# Patient Record
Sex: MALE | Race: OTHER
[De-identification: names, ages, dates, MRNs, and addresses within clinical notes are randomized per-mention and may not be internally consistent; named-entity substitution may affect disease eponyms.]

---

## 2019-02-16 ENCOUNTER — HOSPITAL ENCOUNTER (INPATIENT)
Dept: HOSPITAL 54 - GPS | Age: 69
LOS: 9 days | Discharge: HOME | DRG: 885 | End: 2019-02-25
Attending: PSYCHIATRY & NEUROLOGY | Admitting: PSYCHIATRY & NEUROLOGY
Payer: MEDICARE

## 2019-02-16 VITALS — SYSTOLIC BLOOD PRESSURE: 165 MMHG | DIASTOLIC BLOOD PRESSURE: 83 MMHG

## 2019-02-16 VITALS — SYSTOLIC BLOOD PRESSURE: 138 MMHG | DIASTOLIC BLOOD PRESSURE: 90 MMHG

## 2019-02-16 VITALS — SYSTOLIC BLOOD PRESSURE: 141 MMHG | DIASTOLIC BLOOD PRESSURE: 81 MMHG

## 2019-02-16 VITALS — HEIGHT: 68 IN | WEIGHT: 159 LBS | BODY MASS INDEX: 24.1 KG/M2

## 2019-02-16 VITALS — DIASTOLIC BLOOD PRESSURE: 99 MMHG | SYSTOLIC BLOOD PRESSURE: 160 MMHG

## 2019-02-16 DIAGNOSIS — Z81.8: ICD-10-CM

## 2019-02-16 DIAGNOSIS — F25.0: Primary | ICD-10-CM

## 2019-02-16 DIAGNOSIS — E87.1: ICD-10-CM

## 2019-02-16 DIAGNOSIS — I10: ICD-10-CM

## 2019-02-16 DIAGNOSIS — D63.8: ICD-10-CM

## 2019-02-16 DIAGNOSIS — Z86.73: ICD-10-CM

## 2019-02-16 DIAGNOSIS — F17.200: ICD-10-CM

## 2019-02-16 DIAGNOSIS — E86.1: ICD-10-CM

## 2019-02-16 DIAGNOSIS — F32.9: ICD-10-CM

## 2019-02-16 DIAGNOSIS — T50.901D: ICD-10-CM

## 2019-02-16 DIAGNOSIS — Z87.19: ICD-10-CM

## 2019-02-16 DIAGNOSIS — E44.1: ICD-10-CM

## 2019-02-16 LAB
BASOPHILS # BLD AUTO: 0 /CMM (ref 0–0.2)
BASOPHILS NFR BLD AUTO: 0.5 % (ref 0–2)
BUN SERPL-MCNC: 11 MG/DL (ref 7–18)
CALCIUM SERPL-MCNC: 8.4 MG/DL (ref 8.5–10.1)
CHLORIDE SERPL-SCNC: 98 MMOL/L (ref 98–107)
CHOLEST SERPL-MCNC: 137 MG/DL (ref ?–200)
CO2 SERPL-SCNC: 29 MMOL/L (ref 21–32)
CREAT SERPL-MCNC: 0.8 MG/DL (ref 0.6–1.3)
EOSINOPHIL NFR BLD AUTO: 0.7 % (ref 0–6)
GLUCOSE SERPL-MCNC: 92 MG/DL (ref 74–106)
HCT VFR BLD AUTO: 39 % (ref 39–51)
HDLC SERPL-MCNC: 52 MG/DL (ref 40–60)
HGB BLD-MCNC: 13.3 G/DL (ref 13.5–17.5)
LDLC SERPL DIRECT ASSAY-MCNC: 72 MG/DL (ref 0–99)
LYMPHOCYTES NFR BLD AUTO: 0.8 /CMM (ref 0.8–4.8)
LYMPHOCYTES NFR BLD AUTO: 9 % (ref 20–44)
MCHC RBC AUTO-ENTMCNC: 34 G/DL (ref 31–36)
MCV RBC AUTO: 90 FL (ref 80–96)
MONOCYTES NFR BLD AUTO: 1 /CMM (ref 0.1–1.3)
MONOCYTES NFR BLD AUTO: 11.9 % (ref 2–12)
NEUTROPHILS # BLD AUTO: 6.7 /CMM (ref 1.8–8.9)
NEUTROPHILS NFR BLD AUTO: 77.9 % (ref 43–81)
PLATELET # BLD AUTO: 331 /CMM (ref 150–450)
POTASSIUM SERPL-SCNC: 3.9 MMOL/L (ref 3.5–5.1)
RBC # BLD AUTO: 4.34 MIL/UL (ref 4.5–6)
SODIUM SERPL-SCNC: 133 MMOL/L (ref 136–145)
TRIGL SERPL-MCNC: 66 MG/DL (ref 30–150)
WBC NRBC COR # BLD AUTO: 8.6 K/UL (ref 4.3–11)

## 2019-02-16 RX ADMIN — Medication PRN EA: at 18:34

## 2019-02-16 RX ADMIN — FERROUS SULFATE TAB 325 MG (65 MG ELEMENTAL FE) SCH MG: 325 (65 FE) TAB at 09:32

## 2019-02-16 RX ADMIN — PANTOPRAZOLE SODIUM SCH MG: 40 TABLET, DELAYED RELEASE ORAL at 17:10

## 2019-02-16 RX ADMIN — DIVALPROEX SODIUM SCH MG: 125 TABLET, DELAYED RELEASE ORAL at 13:07

## 2019-02-16 RX ADMIN — LOSARTAN POTASSIUM SCH MG: 50 TABLET, FILM COATED ORAL at 09:31

## 2019-02-16 RX ADMIN — PANTOPRAZOLE SODIUM SCH MG: 40 TABLET, DELAYED RELEASE ORAL at 09:32

## 2019-02-16 RX ADMIN — DIVALPROEX SODIUM SCH MG: 125 TABLET, DELAYED RELEASE ORAL at 21:03

## 2019-02-16 RX ADMIN — AMLODIPINE BESYLATE SCH MG: 5 TABLET ORAL at 09:31

## 2019-02-16 RX ADMIN — LORAZEPAM PRN MG: 0.5 TABLET ORAL at 09:35

## 2019-02-16 RX ADMIN — BENZTROPINE MESYLATE SCH MG: 1 TABLET ORAL at 13:07

## 2019-02-16 RX ADMIN — TAMSULOSIN HYDROCHLORIDE SCH MG: 0.4 CAPSULE ORAL at 17:14

## 2019-02-16 RX ADMIN — LORAZEPAM PRN MG: 0.5 TABLET ORAL at 17:10

## 2019-02-16 RX ADMIN — BENZTROPINE MESYLATE SCH MG: 1 TABLET ORAL at 17:10

## 2019-02-16 NOTE — NUR
PAGED AND SOKE WITH MATTHEW MEHTA, RE: MED RECON - DONE

MRSA SCREEN DONE. ROOM MATE EKTA RESENDEZ NOTIFIED ABOUT ADMISSION PER PATIENT'S ADMISSION TO 
GPS.

## 2019-02-16 NOTE — NUR
RN NOTES

 ADMINISTERED ATIVAN 1 MG  PO PRN FOR ANXIETY PER PATIENT REQUEST. V/S TAKEN /90, 
P-21, CONTINUED MONITORING.

## 2019-02-16 NOTE — NUR
ADMITTED A 68 YEAR OLD ON 5150 HOLD FOR DTS, CAME TO THE UNIT AROUND 0115, BROUGHT IN BY 
PARAMEDICS. PATIENT WAS GIVEN ORIENTATION ON ADMISSION. PATIENT OVERDOSED ON PRESCRIBED 
MEDICATIONS. DENIES SI/HI AT THIS TIME. PATIENT AWAKE, ALERT, ORIENTED 3, NO APPARENT 
DISTRESS NOTED. SHOWS NO S/S OF ANY PAIN. PATIENT IS A/O X3, CALM, COOPERATIVE, APPROPRIATE, 
THOUGHT PROCESS INTACT, WELL GROOMED. DENIES ANY PAIN. AMBULATORY, SKIN WARM DRY AND INTACT. 
DISCOLORATION NOTED BOTH ARMS, PHOTO TAKEN, MRSA SCREEN DONE. BELONGINGS WERE INVENTORIED 
AND CHECKED FOR CONTRABAND. BED LOCKED AND PLACED ON LOWEST POSITION. WILL CONTINUE TO 
MONITOR Q 15 MINS. TO MAINTAIN SAFETY.

## 2019-02-17 VITALS — DIASTOLIC BLOOD PRESSURE: 87 MMHG | SYSTOLIC BLOOD PRESSURE: 158 MMHG

## 2019-02-17 VITALS — SYSTOLIC BLOOD PRESSURE: 145 MMHG | DIASTOLIC BLOOD PRESSURE: 92 MMHG

## 2019-02-17 VITALS — SYSTOLIC BLOOD PRESSURE: 153 MMHG | DIASTOLIC BLOOD PRESSURE: 89 MMHG

## 2019-02-17 LAB
ALBUMIN SERPL BCP-MCNC: 3.3 G/DL (ref 3.4–5)
ALP SERPL-CCNC: 68 U/L (ref 46–116)
ALT SERPL W P-5'-P-CCNC: 28 U/L (ref 12–78)
AST SERPL W P-5'-P-CCNC: 24 U/L (ref 15–37)
BASOPHILS # BLD AUTO: 0.1 /CMM (ref 0–0.2)
BASOPHILS NFR BLD AUTO: 0.8 % (ref 0–2)
BILIRUB SERPL-MCNC: 0.3 MG/DL (ref 0.2–1)
BUN SERPL-MCNC: 9 MG/DL (ref 7–18)
CALCIUM SERPL-MCNC: 8.7 MG/DL (ref 8.5–10.1)
CHLORIDE SERPL-SCNC: 99 MMOL/L (ref 98–107)
CHOLEST SERPL-MCNC: 139 MG/DL (ref ?–200)
CO2 SERPL-SCNC: 25 MMOL/L (ref 21–32)
CREAT SERPL-MCNC: 0.8 MG/DL (ref 0.6–1.3)
EOSINOPHIL NFR BLD AUTO: 1.9 % (ref 0–6)
GLUCOSE SERPL-MCNC: 195 MG/DL (ref 74–106)
HCT VFR BLD AUTO: 41 % (ref 39–51)
HDLC SERPL-MCNC: 51 MG/DL (ref 40–60)
HGB BLD-MCNC: 13.7 G/DL (ref 13.5–17.5)
LDLC SERPL DIRECT ASSAY-MCNC: 75 MG/DL (ref 0–99)
LYMPHOCYTES NFR BLD AUTO: 1.2 /CMM (ref 0.8–4.8)
LYMPHOCYTES NFR BLD AUTO: 15.1 % (ref 20–44)
MCHC RBC AUTO-ENTMCNC: 33 G/DL (ref 31–36)
MCV RBC AUTO: 91 FL (ref 80–96)
MONOCYTES NFR BLD AUTO: 1 /CMM (ref 0.1–1.3)
MONOCYTES NFR BLD AUTO: 13.4 % (ref 2–12)
NEUTROPHILS # BLD AUTO: 5.3 /CMM (ref 1.8–8.9)
NEUTROPHILS NFR BLD AUTO: 68.8 % (ref 43–81)
PLATELET # BLD AUTO: 321 /CMM (ref 150–450)
POTASSIUM SERPL-SCNC: 4.1 MMOL/L (ref 3.5–5.1)
PROT SERPL-MCNC: 6.5 G/DL (ref 6.4–8.2)
RBC # BLD AUTO: 4.54 MIL/UL (ref 4.5–6)
SODIUM SERPL-SCNC: 134 MMOL/L (ref 136–145)
TRIGL SERPL-MCNC: 76 MG/DL (ref 30–150)
WBC NRBC COR # BLD AUTO: 7.7 K/UL (ref 4.3–11)

## 2019-02-17 RX ADMIN — PANTOPRAZOLE SODIUM SCH MG: 40 TABLET, DELAYED RELEASE ORAL at 09:56

## 2019-02-17 RX ADMIN — BENZTROPINE MESYLATE SCH MG: 1 TABLET ORAL at 09:56

## 2019-02-17 RX ADMIN — LORAZEPAM PRN MG: 0.5 TABLET ORAL at 09:22

## 2019-02-17 RX ADMIN — BENZTROPINE MESYLATE SCH MG: 1 TABLET ORAL at 16:20

## 2019-02-17 RX ADMIN — Medication PRN MG: at 16:20

## 2019-02-17 RX ADMIN — PANTOPRAZOLE SODIUM SCH MG: 40 TABLET, DELAYED RELEASE ORAL at 16:20

## 2019-02-17 RX ADMIN — TAMSULOSIN HYDROCHLORIDE SCH MG: 0.4 CAPSULE ORAL at 17:16

## 2019-02-17 RX ADMIN — LORAZEPAM PRN MG: 0.5 TABLET ORAL at 17:34

## 2019-02-17 RX ADMIN — LOSARTAN POTASSIUM SCH MG: 50 TABLET, FILM COATED ORAL at 09:57

## 2019-02-17 RX ADMIN — DIVALPROEX SODIUM SCH MG: 125 TABLET, DELAYED RELEASE ORAL at 09:53

## 2019-02-17 RX ADMIN — FERROUS SULFATE TAB 325 MG (65 MG ELEMENTAL FE) SCH MG: 325 (65 FE) TAB at 09:53

## 2019-02-17 RX ADMIN — AMLODIPINE BESYLATE SCH MG: 5 TABLET ORAL at 09:56

## 2019-02-17 RX ADMIN — DIVALPROEX SODIUM SCH MG: 125 TABLET, DELAYED RELEASE ORAL at 20:19

## 2019-02-17 RX ADMIN — Medication PRN EA: at 15:24

## 2019-02-17 NOTE — NUR
GPS/RN-NOTES

PATIENT STATED" I NEED MY ATIVAN FOR ANXIETY". ATIVAN 1MG P.O GIVEN AS PRN ORDER. WILL CONT. 
MONITORING FOR SAFETY AND BEHAVIOR.

## 2019-02-17 NOTE — NUR
GPS/RN-NOTES

PATIENT REQUESTING FOR ATIVAN FOR ANXIETY. ATIVAN 1MG P.O GIVEN AS PRN ORDER. WILL CONT. 
MONITORING FOR SAFETY AND BEHAVIOR.

## 2019-02-18 VITALS — SYSTOLIC BLOOD PRESSURE: 152 MMHG | DIASTOLIC BLOOD PRESSURE: 85 MMHG

## 2019-02-18 VITALS — DIASTOLIC BLOOD PRESSURE: 81 MMHG | SYSTOLIC BLOOD PRESSURE: 157 MMHG

## 2019-02-18 VITALS — SYSTOLIC BLOOD PRESSURE: 144 MMHG | DIASTOLIC BLOOD PRESSURE: 87 MMHG

## 2019-02-18 RX ADMIN — DIVALPROEX SODIUM SCH MG: 125 TABLET, DELAYED RELEASE ORAL at 21:11

## 2019-02-18 RX ADMIN — PANTOPRAZOLE SODIUM SCH MG: 40 TABLET, DELAYED RELEASE ORAL at 16:20

## 2019-02-18 RX ADMIN — BENZTROPINE MESYLATE SCH MG: 1 TABLET ORAL at 08:16

## 2019-02-18 RX ADMIN — PANTOPRAZOLE SODIUM SCH MG: 40 TABLET, DELAYED RELEASE ORAL at 08:16

## 2019-02-18 RX ADMIN — LORAZEPAM PRN MG: 0.5 TABLET ORAL at 16:19

## 2019-02-18 RX ADMIN — DIVALPROEX SODIUM SCH MG: 125 TABLET, DELAYED RELEASE ORAL at 08:15

## 2019-02-18 RX ADMIN — FERROUS SULFATE TAB 325 MG (65 MG ELEMENTAL FE) SCH MG: 325 (65 FE) TAB at 08:36

## 2019-02-18 RX ADMIN — LOSARTAN POTASSIUM SCH MG: 50 TABLET, FILM COATED ORAL at 08:16

## 2019-02-18 RX ADMIN — AMLODIPINE BESYLATE SCH MG: 5 TABLET ORAL at 08:17

## 2019-02-18 RX ADMIN — BENZTROPINE MESYLATE SCH MG: 1 TABLET ORAL at 16:20

## 2019-02-18 RX ADMIN — LORAZEPAM PRN MG: 0.5 TABLET ORAL at 08:15

## 2019-02-18 RX ADMIN — TAMSULOSIN HYDROCHLORIDE SCH MG: 0.4 CAPSULE ORAL at 17:44

## 2019-02-18 NOTE — NUR
ALEX contacted pts sister Regi [phone] to discuss discharge planning and for collateral 
information. Regi stated pt was hospitalized 3.5 weeks ago and had received a Risperdal shot 
by Dr. John Hurd at [facility]. Regi stated that she would 
be available to pick pt up when stable for discharge and transport home.

## 2019-02-18 NOTE — NUR
ALEX contacted [facility] Address: [address], [place] 
[address] Phone: [phone] to confirm pt had received a Risperdal IM. Office was closed due 
to the Holiday. ALEX will follow-up tomorrow 2/19/19.

## 2019-02-18 NOTE — NUR
GPS/RN-NOTES

PATIENT REQUESTING ATIVAN FOR ANXIETY. ATIVAN 1MG P.O GIVEN AS PRN ORDER. WILL CONT. 
MONITORING FOR SAFETY AND BEHAVIOR.

## 2019-02-18 NOTE — NUR
INITIAL DISCHARGE PLAN: Patient wishes to return home to [address] [place] [address] [address] [place] 
[address] [phone]. Pts sister Regi [phone] will transport pt home once stable for 
discharge. SW will help form a safe and proper discharge in collaboration with pt and MD.

## 2019-02-19 VITALS — SYSTOLIC BLOOD PRESSURE: 156 MMHG | DIASTOLIC BLOOD PRESSURE: 95 MMHG

## 2019-02-19 VITALS — DIASTOLIC BLOOD PRESSURE: 93 MMHG | SYSTOLIC BLOOD PRESSURE: 155 MMHG

## 2019-02-19 VITALS — DIASTOLIC BLOOD PRESSURE: 80 MMHG | SYSTOLIC BLOOD PRESSURE: 142 MMHG

## 2019-02-19 RX ADMIN — LOSARTAN POTASSIUM SCH MG: 50 TABLET, FILM COATED ORAL at 08:32

## 2019-02-19 RX ADMIN — FERROUS SULFATE TAB 325 MG (65 MG ELEMENTAL FE) SCH MG: 325 (65 FE) TAB at 08:33

## 2019-02-19 RX ADMIN — DIVALPROEX SODIUM SCH MG: 125 TABLET, DELAYED RELEASE ORAL at 21:11

## 2019-02-19 RX ADMIN — BENZTROPINE MESYLATE SCH MG: 1 TABLET ORAL at 17:20

## 2019-02-19 RX ADMIN — PANTOPRAZOLE SODIUM SCH MG: 40 TABLET, DELAYED RELEASE ORAL at 08:33

## 2019-02-19 RX ADMIN — PANTOPRAZOLE SODIUM SCH MG: 40 TABLET, DELAYED RELEASE ORAL at 17:19

## 2019-02-19 RX ADMIN — Medication PRN MG: at 17:20

## 2019-02-19 RX ADMIN — BENZTROPINE MESYLATE SCH MG: 1 TABLET ORAL at 08:32

## 2019-02-19 RX ADMIN — ATORVASTATIN CALCIUM SCH MG: 10 TABLET, FILM COATED ORAL at 21:12

## 2019-02-19 RX ADMIN — LORAZEPAM PRN MG: 0.5 TABLET ORAL at 09:36

## 2019-02-19 RX ADMIN — Medication PRN MG: at 08:32

## 2019-02-19 RX ADMIN — AMLODIPINE BESYLATE SCH MG: 5 TABLET ORAL at 08:33

## 2019-02-19 RX ADMIN — DIVALPROEX SODIUM SCH MG: 125 TABLET, DELAYED RELEASE ORAL at 08:32

## 2019-02-19 RX ADMIN — Medication PRN EA: at 12:18

## 2019-02-19 RX ADMIN — TAMSULOSIN HYDROCHLORIDE SCH MG: 0.4 CAPSULE ORAL at 17:19

## 2019-02-19 NOTE — NUR
ALEX received a phone call from KERI Falcon at [facility] Address: [address], [place] [address] Phone: [phone] who informed SW that pt was 
recently given 2 Invega Sustenna injections one on 2/6/19 234mg, and the other on 2/13/19 
156mg. Terrie, stated pt is due for another injection on 3/13/19. She also stated pt is 
taking Congestin .5mg BID and Chlorpromazine 200mg at bedtime. ALEX informed her that she 
would notify psychiatrist for possible medication adjustments. ALEX will also fax [phone] 
discharge paperwork and contact her to schedule an aftercare appointment.

## 2019-02-20 VITALS — SYSTOLIC BLOOD PRESSURE: 162 MMHG | DIASTOLIC BLOOD PRESSURE: 94 MMHG

## 2019-02-20 VITALS — SYSTOLIC BLOOD PRESSURE: 135 MMHG | DIASTOLIC BLOOD PRESSURE: 69 MMHG

## 2019-02-20 VITALS — DIASTOLIC BLOOD PRESSURE: 85 MMHG | SYSTOLIC BLOOD PRESSURE: 155 MMHG

## 2019-02-20 RX ADMIN — FERROUS SULFATE TAB 325 MG (65 MG ELEMENTAL FE) SCH MG: 325 (65 FE) TAB at 08:22

## 2019-02-20 RX ADMIN — DIVALPROEX SODIUM SCH MG: 125 TABLET, DELAYED RELEASE ORAL at 08:22

## 2019-02-20 RX ADMIN — BENZTROPINE MESYLATE SCH MG: 1 TABLET ORAL at 08:23

## 2019-02-20 RX ADMIN — LORAZEPAM PRN MG: 0.5 TABLET ORAL at 13:26

## 2019-02-20 RX ADMIN — ATORVASTATIN CALCIUM SCH MG: 10 TABLET, FILM COATED ORAL at 20:36

## 2019-02-20 RX ADMIN — LORAZEPAM PRN MG: 0.5 TABLET ORAL at 20:36

## 2019-02-20 RX ADMIN — LORAZEPAM PRN MG: 0.5 TABLET ORAL at 00:00

## 2019-02-20 RX ADMIN — PANTOPRAZOLE SODIUM SCH MG: 40 TABLET, DELAYED RELEASE ORAL at 17:05

## 2019-02-20 RX ADMIN — LOSARTAN POTASSIUM SCH MG: 50 TABLET, FILM COATED ORAL at 08:24

## 2019-02-20 RX ADMIN — PANTOPRAZOLE SODIUM SCH MG: 40 TABLET, DELAYED RELEASE ORAL at 08:23

## 2019-02-20 RX ADMIN — ASPIRIN 81 MG SCH MG: 81 TABLET ORAL at 09:17

## 2019-02-20 RX ADMIN — TAMSULOSIN HYDROCHLORIDE SCH MG: 0.4 CAPSULE ORAL at 17:05

## 2019-02-20 RX ADMIN — BENZTROPINE MESYLATE SCH MG: 1 TABLET ORAL at 17:05

## 2019-02-20 RX ADMIN — DIVALPROEX SODIUM SCH MG: 125 TABLET, DELAYED RELEASE ORAL at 20:36

## 2019-02-20 RX ADMIN — AMLODIPINE BESYLATE SCH MG: 5 TABLET ORAL at 08:25

## 2019-02-21 VITALS — DIASTOLIC BLOOD PRESSURE: 92 MMHG | SYSTOLIC BLOOD PRESSURE: 160 MMHG

## 2019-02-21 VITALS — DIASTOLIC BLOOD PRESSURE: 89 MMHG | SYSTOLIC BLOOD PRESSURE: 152 MMHG

## 2019-02-21 RX ADMIN — FERROUS SULFATE TAB 325 MG (65 MG ELEMENTAL FE) SCH MG: 325 (65 FE) TAB at 08:38

## 2019-02-21 RX ADMIN — DIVALPROEX SODIUM SCH MG: 125 TABLET, DELAYED RELEASE ORAL at 20:49

## 2019-02-21 RX ADMIN — BENZTROPINE MESYLATE SCH MG: 1 TABLET ORAL at 17:21

## 2019-02-21 RX ADMIN — ATORVASTATIN CALCIUM SCH MG: 10 TABLET, FILM COATED ORAL at 21:04

## 2019-02-21 RX ADMIN — AMLODIPINE BESYLATE SCH MG: 5 TABLET ORAL at 08:39

## 2019-02-21 RX ADMIN — TAMSULOSIN HYDROCHLORIDE SCH MG: 0.4 CAPSULE ORAL at 17:21

## 2019-02-21 RX ADMIN — LORAZEPAM PRN MG: 0.5 TABLET ORAL at 10:21

## 2019-02-21 RX ADMIN — LORAZEPAM PRN MG: 0.5 TABLET ORAL at 19:45

## 2019-02-21 RX ADMIN — ASPIRIN 81 MG SCH MG: 81 TABLET ORAL at 08:38

## 2019-02-21 RX ADMIN — PANTOPRAZOLE SODIUM SCH MG: 40 TABLET, DELAYED RELEASE ORAL at 17:21

## 2019-02-21 RX ADMIN — DIVALPROEX SODIUM SCH MG: 125 TABLET, DELAYED RELEASE ORAL at 08:37

## 2019-02-21 RX ADMIN — PANTOPRAZOLE SODIUM SCH MG: 40 TABLET, DELAYED RELEASE ORAL at 08:38

## 2019-02-21 RX ADMIN — BENZTROPINE MESYLATE SCH MG: 1 TABLET ORAL at 08:37

## 2019-02-21 RX ADMIN — LOSARTAN POTASSIUM SCH MG: 50 TABLET, FILM COATED ORAL at 08:38

## 2019-02-22 VITALS — DIASTOLIC BLOOD PRESSURE: 68 MMHG | SYSTOLIC BLOOD PRESSURE: 121 MMHG

## 2019-02-22 VITALS — SYSTOLIC BLOOD PRESSURE: 155 MMHG | DIASTOLIC BLOOD PRESSURE: 95 MMHG

## 2019-02-22 VITALS — SYSTOLIC BLOOD PRESSURE: 144 MMHG | DIASTOLIC BLOOD PRESSURE: 85 MMHG

## 2019-02-22 RX ADMIN — BENZTROPINE MESYLATE SCH MG: 1 TABLET ORAL at 09:43

## 2019-02-22 RX ADMIN — LOSARTAN POTASSIUM SCH MG: 50 TABLET, FILM COATED ORAL at 09:43

## 2019-02-22 RX ADMIN — TAMSULOSIN HYDROCHLORIDE SCH MG: 0.4 CAPSULE ORAL at 18:09

## 2019-02-22 RX ADMIN — PANTOPRAZOLE SODIUM SCH MG: 40 TABLET, DELAYED RELEASE ORAL at 16:14

## 2019-02-22 RX ADMIN — LORAZEPAM PRN MG: 0.5 TABLET ORAL at 16:14

## 2019-02-22 RX ADMIN — BENZTROPINE MESYLATE SCH MG: 1 TABLET ORAL at 16:14

## 2019-02-22 RX ADMIN — PANTOPRAZOLE SODIUM SCH MG: 40 TABLET, DELAYED RELEASE ORAL at 09:42

## 2019-02-22 RX ADMIN — AMLODIPINE BESYLATE SCH MG: 5 TABLET ORAL at 09:43

## 2019-02-22 RX ADMIN — DIVALPROEX SODIUM SCH MG: 125 TABLET, DELAYED RELEASE ORAL at 21:43

## 2019-02-22 RX ADMIN — ASPIRIN 81 MG SCH MG: 81 TABLET ORAL at 09:42

## 2019-02-22 RX ADMIN — DIVALPROEX SODIUM SCH MG: 125 TABLET, DELAYED RELEASE ORAL at 09:42

## 2019-02-22 RX ADMIN — LORAZEPAM PRN MG: 0.5 TABLET ORAL at 10:58

## 2019-02-22 RX ADMIN — ATORVASTATIN CALCIUM SCH MG: 10 TABLET, FILM COATED ORAL at 23:01

## 2019-02-22 RX ADMIN — Medication PRN MG: at 09:42

## 2019-02-22 RX ADMIN — Medication PRN MG: at 16:14

## 2019-02-22 RX ADMIN — FERROUS SULFATE TAB 325 MG (65 MG ELEMENTAL FE) SCH MG: 325 (65 FE) TAB at 09:42

## 2019-02-23 VITALS — SYSTOLIC BLOOD PRESSURE: 159 MMHG | DIASTOLIC BLOOD PRESSURE: 91 MMHG

## 2019-02-23 VITALS — SYSTOLIC BLOOD PRESSURE: 144 MMHG | DIASTOLIC BLOOD PRESSURE: 88 MMHG

## 2019-02-23 VITALS — SYSTOLIC BLOOD PRESSURE: 100 MMHG | DIASTOLIC BLOOD PRESSURE: 68 MMHG

## 2019-02-23 VITALS — DIASTOLIC BLOOD PRESSURE: 78 MMHG | SYSTOLIC BLOOD PRESSURE: 147 MMHG

## 2019-02-23 RX ADMIN — BENZTROPINE MESYLATE SCH MG: 1 TABLET ORAL at 17:20

## 2019-02-23 RX ADMIN — DIVALPROEX SODIUM SCH MG: 125 TABLET, DELAYED RELEASE ORAL at 08:19

## 2019-02-23 RX ADMIN — AMLODIPINE BESYLATE SCH MG: 5 TABLET ORAL at 08:17

## 2019-02-23 RX ADMIN — BENZTROPINE MESYLATE SCH MG: 1 TABLET ORAL at 08:19

## 2019-02-23 RX ADMIN — ATORVASTATIN CALCIUM SCH MG: 10 TABLET, FILM COATED ORAL at 20:46

## 2019-02-23 RX ADMIN — LORAZEPAM PRN MG: 0.5 TABLET ORAL at 02:36

## 2019-02-23 RX ADMIN — Medication PRN MG: at 17:20

## 2019-02-23 RX ADMIN — PANTOPRAZOLE SODIUM SCH MG: 40 TABLET, DELAYED RELEASE ORAL at 08:19

## 2019-02-23 RX ADMIN — PANTOPRAZOLE SODIUM SCH MG: 40 TABLET, DELAYED RELEASE ORAL at 17:20

## 2019-02-23 RX ADMIN — DIVALPROEX SODIUM SCH MG: 125 TABLET, DELAYED RELEASE ORAL at 20:47

## 2019-02-23 RX ADMIN — LORAZEPAM PRN MG: 0.5 TABLET ORAL at 21:30

## 2019-02-23 RX ADMIN — ASPIRIN 81 MG SCH MG: 81 TABLET ORAL at 08:19

## 2019-02-23 RX ADMIN — LORAZEPAM PRN MG: 0.5 TABLET ORAL at 12:28

## 2019-02-23 RX ADMIN — FERROUS SULFATE TAB 325 MG (65 MG ELEMENTAL FE) SCH MG: 325 (65 FE) TAB at 08:19

## 2019-02-23 RX ADMIN — LORAZEPAM PRN MG: 0.5 TABLET ORAL at 08:18

## 2019-02-23 RX ADMIN — LOSARTAN POTASSIUM SCH MG: 50 TABLET, FILM COATED ORAL at 08:16

## 2019-02-23 RX ADMIN — TAMSULOSIN HYDROCHLORIDE SCH MG: 0.4 CAPSULE ORAL at 17:20

## 2019-02-24 VITALS — SYSTOLIC BLOOD PRESSURE: 163 MMHG | DIASTOLIC BLOOD PRESSURE: 94 MMHG

## 2019-02-24 VITALS — DIASTOLIC BLOOD PRESSURE: 80 MMHG | SYSTOLIC BLOOD PRESSURE: 125 MMHG

## 2019-02-24 VITALS — DIASTOLIC BLOOD PRESSURE: 77 MMHG | SYSTOLIC BLOOD PRESSURE: 115 MMHG

## 2019-02-24 VITALS — SYSTOLIC BLOOD PRESSURE: 135 MMHG | DIASTOLIC BLOOD PRESSURE: 64 MMHG

## 2019-02-24 RX ADMIN — BENZTROPINE MESYLATE SCH MG: 1 TABLET ORAL at 17:12

## 2019-02-24 RX ADMIN — ATORVASTATIN CALCIUM SCH MG: 10 TABLET, FILM COATED ORAL at 21:08

## 2019-02-24 RX ADMIN — BENZTROPINE MESYLATE SCH MG: 1 TABLET ORAL at 08:57

## 2019-02-24 RX ADMIN — ASPIRIN 81 MG SCH MG: 81 TABLET ORAL at 08:56

## 2019-02-24 RX ADMIN — AMLODIPINE BESYLATE SCH MG: 5 TABLET ORAL at 08:57

## 2019-02-24 RX ADMIN — DIVALPROEX SODIUM SCH MG: 125 TABLET, DELAYED RELEASE ORAL at 21:08

## 2019-02-24 RX ADMIN — LORAZEPAM PRN MG: 1 TABLET ORAL at 17:10

## 2019-02-24 RX ADMIN — LOSARTAN POTASSIUM SCH MG: 50 TABLET, FILM COATED ORAL at 08:57

## 2019-02-24 RX ADMIN — TAMSULOSIN HYDROCHLORIDE SCH MG: 0.4 CAPSULE ORAL at 17:14

## 2019-02-24 RX ADMIN — PANTOPRAZOLE SODIUM SCH MG: 40 TABLET, DELAYED RELEASE ORAL at 08:56

## 2019-02-24 RX ADMIN — PANTOPRAZOLE SODIUM SCH MG: 40 TABLET, DELAYED RELEASE ORAL at 17:12

## 2019-02-24 RX ADMIN — Medication PRN MG: at 17:13

## 2019-02-24 RX ADMIN — DIVALPROEX SODIUM SCH MG: 125 TABLET, DELAYED RELEASE ORAL at 08:56

## 2019-02-24 RX ADMIN — Medication PRN MG: at 08:56

## 2019-02-24 RX ADMIN — FERROUS SULFATE TAB 325 MG (65 MG ELEMENTAL FE) SCH MG: 325 (65 FE) TAB at 08:58

## 2019-02-24 RX ADMIN — LORAZEPAM PRN MG: 0.5 TABLET ORAL at 08:56

## 2019-02-24 NOTE — NUR
GPS/RN 

CALLED FOR  PODIATRY CONSULT AS PER KAELA REQUEST



Office Telephone



[phone]



Office Fax



[phone]

## 2019-02-25 VITALS — SYSTOLIC BLOOD PRESSURE: 149 MMHG | DIASTOLIC BLOOD PRESSURE: 71 MMHG

## 2019-02-25 VITALS — DIASTOLIC BLOOD PRESSURE: 65 MMHG | SYSTOLIC BLOOD PRESSURE: 134 MMHG

## 2019-02-25 RX ADMIN — PANTOPRAZOLE SODIUM SCH MG: 40 TABLET, DELAYED RELEASE ORAL at 09:01

## 2019-02-25 RX ADMIN — LORAZEPAM PRN MG: 1 TABLET ORAL at 12:57

## 2019-02-25 RX ADMIN — FERROUS SULFATE TAB 325 MG (65 MG ELEMENTAL FE) SCH MG: 325 (65 FE) TAB at 09:00

## 2019-02-25 RX ADMIN — BENZTROPINE MESYLATE SCH MG: 1 TABLET ORAL at 09:01

## 2019-02-25 RX ADMIN — Medication PRN MG: at 09:01

## 2019-02-25 RX ADMIN — PANTOPRAZOLE SODIUM SCH MG: 40 TABLET, DELAYED RELEASE ORAL at 16:49

## 2019-02-25 RX ADMIN — AMLODIPINE BESYLATE SCH MG: 5 TABLET ORAL at 09:01

## 2019-02-25 RX ADMIN — ASPIRIN 81 MG SCH MG: 81 TABLET ORAL at 09:01

## 2019-02-25 RX ADMIN — BENZTROPINE MESYLATE SCH MG: 1 TABLET ORAL at 16:49

## 2019-02-25 RX ADMIN — DIVALPROEX SODIUM SCH MG: 125 TABLET, DELAYED RELEASE ORAL at 09:00

## 2019-02-25 RX ADMIN — LOSARTAN POTASSIUM SCH MG: 50 TABLET, FILM COATED ORAL at 09:00

## 2019-02-25 NOTE — NUR
RN-CO: Patient was picked up by sister Regi [phone]. Prescriptions was discussed and 
she verbalized understanding. All belongings and valuables were given back. Patient was 
escorted by hospital staff in th [facility].

## 2019-02-25 NOTE — NUR
ALEX contacted pts sister Regi [phone] and left voicemail to inform her pt was 
discharging on this present day.

## 2019-02-25 NOTE — NUR
RN-CO: PATIENT WAS SEEN AND EXAMINED BY DR ALEXANDER WITH ORDER TO DISCONTINUE HOLD AND 
DISCHARGE PATIENT HOME TODAY. MONIKA SHERWOOD NP WAS NOTIFIED AND MEDICALLY CLEARED FOR 
DISCHARGE. PATIENT REMAINS CALM AND COOPERATIVE TO CARE. DENIED SUICIDAL AND HOMICIDAL 
IDEATION. DENIED AUDITORY AND VISUAL HALLUCINATION. ALL DISCHARGE PAPERS WERE EXPLAINED 
INCLUDING HIS PRESCRIPTION. NICK (INVEGA 156 MG IM Q 4 WEEKS WHICH IS DUE 3/13/19). AND HIS 
FOLLOW UP APPOINTMENTS BELONGINGS WILL BE GIVEN BACK TO HIM..

## 2019-02-25 NOTE — NUR
ALEX faxed clinical information to KERI Falcon at [facility] Address: [address], [place] [address] Phone: [phone] Fax: [phone].

## 2019-02-25 NOTE — NUR
DISCHARGE NOTE: Pt will be discharged at 5:00pm via private vehicle home to [address] [place] [id number]. Pts sister Regi [phone] will pick pt up and transport home. Pts mood 
is euthymic with congruent affect. Pt denied suicidal/homicidal ideations and denied 
visual/auditory hallucinations. Pt will address substance abuse and continue psychiatric 
treatment with Psychiatrist: Dr. Axel Hurd [facility] 
Address: [address] [place] [address] [address] [facility], [place] [address] Phone: [phone] on Tuesday February 26, 2019 at 10:00am. Pt will also follow-up with Internist: Dr. Kade Jimenez [address] 
[facility], [place] [address] [phone]. For smoking cessation, patient was referred 
to the [employer] [phone] and [employer] 800-Lung-USA. Pt 
will also participate in a telephone meeting with [employer] [phone] on Tuesday February 26, 2019 at 8:00am. The multidisciplinary exitcare form was done, printed, signed, 
and given to the patient.